# Patient Record
Sex: FEMALE
[De-identification: names, ages, dates, MRNs, and addresses within clinical notes are randomized per-mention and may not be internally consistent; named-entity substitution may affect disease eponyms.]

---

## 2020-07-17 PROBLEM — Z00.00 ENCOUNTER FOR PREVENTIVE HEALTH EXAMINATION: Status: ACTIVE | Noted: 2020-07-17

## 2020-07-28 ENCOUNTER — APPOINTMENT (OUTPATIENT)
Dept: ORTHOPEDIC SURGERY | Facility: CLINIC | Age: 55
End: 2020-07-28
Payer: COMMERCIAL

## 2020-07-28 VITALS
SYSTOLIC BLOOD PRESSURE: 130 MMHG | HEIGHT: 64 IN | DIASTOLIC BLOOD PRESSURE: 87 MMHG | WEIGHT: 158 LBS | BODY MASS INDEX: 26.98 KG/M2 | HEART RATE: 86 BPM

## 2020-07-28 PROCEDURE — 73564 X-RAY EXAM KNEE 4 OR MORE: CPT | Mod: RT

## 2020-07-28 PROCEDURE — 99204 OFFICE O/P NEW MOD 45 MIN: CPT | Mod: 25

## 2020-07-28 PROCEDURE — 20611 DRAIN/INJ JOINT/BURSA W/US: CPT | Mod: RT

## 2020-07-28 RX ORDER — MULTIVITAMIN
TABLET ORAL
Refills: 0 | Status: ACTIVE | COMMUNITY

## 2020-07-28 RX ORDER — MULTIVIT-MIN/IRON/FOLIC ACID/K 18-600-40
CAPSULE ORAL
Refills: 0 | Status: ACTIVE | COMMUNITY

## 2020-07-28 RX ORDER — UBIDECARENONE 50 MG
600 CAPSULE ORAL
Refills: 0 | Status: ACTIVE | COMMUNITY

## 2020-07-28 RX ORDER — CHROMIUM 200 MCG
TABLET ORAL
Refills: 0 | Status: ACTIVE | COMMUNITY

## 2020-07-28 NOTE — PHYSICAL EXAM
[de-identified] : The patient appears well nourished  and in no apparent distress.  The patient is alert and oriented to person, place, and time.   Affect and mood appear normal. The head is normocephalic and atraumatic.  The eyes reveal normal sclera and extra ocular muscles are intact. The tongue is midline with no apparent lesions.  Skin shows normal turgor with no evidence of eczema or psoriasis.  No respiratory distress noted.  Sensation grossly intact.\par   [de-identified] : Exam of the right knee shows a small effusion, medial joint line tenderness to palpation, discomfort with Simon testing, flexion of 135 degrees. 5/5 motor strength bilaterally distally. Sensation intact distally.  [de-identified] : Xray- 4 views of the right knee shows mild arthritis of the medial compartment of the right knee.

## 2020-07-28 NOTE — CONSULT LETTER
[Dear  ___] : Dear  [unfilled], [Please see my note below.] : Please see my note below. [Consult Letter:] : I had the pleasure of evaluating your patient, [unfilled]. [Sincerely,] : Sincerely, [Consult Closing:] : Thank you very much for allowing me to participate in the care of this patient.  If you have any questions, please do not hesitate to contact me. [FreeTextEntry2] : COLTON MARIE MD\par  [FreeTextEntry3] : Lee Webster MD\par Chief of Joint Replacement\par [facility] \par [facility] [place]\par \par

## 2020-07-28 NOTE — HISTORY OF PRESENT ILLNESS
[de-identified] : The patient is a 55 year old female being seen for evaluation of her right knee. She denies injury, trauma, or change of activity. She reports pain began in February and was intermittent in nature. She reports 1 month ago pain progressed. She is ambulating and transferring with intermittent pain. She reports pain in the medial aspect of the right knee. She reports a snapping sensation. Pain is worse with transferring and weightbearing activities, most especially stair climbing. She reports using ice with mild relief of her symptoms. She reports using Naproxen with mild relief of her symptoms. She comes in today for evaluation of the right knee and for treatment options.

## 2020-07-28 NOTE — DISCUSSION/SUMMARY
[de-identified] : The patient is a 55 year old female with a possible medial meniscus tear in the setting of mild arthritis of the medial compartment of the right knee. Conservative options were discussed. She was given a cortisone injection in the right knee under ultrasound-guidance. She was sent for an MRI to evaluate the medial meniscus of the right knee. She was given a prescription for anti-inflammatories. I recommended a course of Mobic. The patient was given a prescription for the Mobic with directions. She was instructed to stop the medicine and call the office if there are any adverse reaction to the medicine. She was also instructed to consult with their primary care doctor prior to starting the medication. She will follow up with the results of the MRI for further treatment options.

## 2020-07-28 NOTE — PROCEDURE
[de-identified] : Using sterile technique, 2cc of depomedrol 40mg/ml, 4cc of 1% plain lidocaine, and 2 cc 0.25% marcaine was drawn up into a sterile syringe. The right knee joint space was identified using ultrasound. The right knee was then sterilely prepped with chlorhexidine. Ethyl chloride spray was used to anesthetize the skin and subQ tissue. The depomedrol/lidocaine/marcaine mixture was then injected into the knee joint in the superolateral position under ultrasound guidance and the needle position in the joint space was confirmed. The patient tolerated the procedure well without difficulty. The patient was given instructions on the use of ice and anti-inflammatories post injection site soreness.

## 2020-07-28 NOTE — ADDENDUM
[FreeTextEntry1] : This note was authored by Swapnil Forte working as a medical scribe for Dr. Lee Webster. The note was reviewed, edited, and revised by Dr. Lee Webster whom is in agreement with the exam findings, imaging findings, and treatment plan. 07/28/2020.

## 2020-08-19 ENCOUNTER — RX RENEWAL (OUTPATIENT)
Age: 55
End: 2020-08-19

## 2020-09-01 ENCOUNTER — APPOINTMENT (OUTPATIENT)
Dept: ORTHOPEDIC SURGERY | Facility: CLINIC | Age: 55
End: 2020-09-01
Payer: COMMERCIAL

## 2020-09-01 VITALS — HEIGHT: 64 IN | BODY MASS INDEX: 26.98 KG/M2 | WEIGHT: 158 LBS

## 2020-09-01 PROCEDURE — 99213 OFFICE O/P EST LOW 20 MIN: CPT

## 2020-09-01 NOTE — PHYSICAL EXAM
[de-identified] : The patient appears well nourished  and in no apparent distress.  The patient is alert and oriented to person, place, and time.   Affect and mood appear normal. The head is normocephalic and atraumatic.  The eyes reveal normal sclera and extra ocular muscles are intact. The tongue is midline with no apparent lesions.  Skin shows normal turgor with no evidence of eczema or psoriasis.  No respiratory distress noted.  Sensation grossly intact.\par   [de-identified] : Exam of the right knee shows a small effusion, medial joint line tenderness to palpation, discomfort with Simon testing, flexion of 135 degrees. 5/5 motor strength bilaterally distally. Sensation intact distally.  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

## 2020-09-01 NOTE — HISTORY OF PRESENT ILLNESS
[de-identified] : Patient is a 55-year-old female presenting for MRI follow-up right knee.  She states she has had right knee pain since February but denied any injuries or trauma.  Patient was last seen in office on July 28, 2020 with medial joint line pain and discomfort with Sonya's testing.  Patient was sent for an MRI to evaluate for right knee medial meniscus tear.  She began some conservative management with meloxicam 7.5 mg twice daily which she continues to take and states is working very well for her.  She has not had physical therapy but is active with home exercise.  She has been icing and elevating the knee as needed. She received a Depo-Medrol injection to the right knee last visit which has helped greatly to reduce her pain. Overall her pain has much improved since last visit.  Patient presents today for MRI follow-up.

## 2020-09-01 NOTE — DISCUSSION/SUMMARY
[de-identified] : The patient is a 55 year old female with a degenerative medial meniscus tear in the setting of mild arthritis of the medial compartment of the right knee. Conservative options were discussed. She has great improvement of her symptoms from her previous cortisone injection. She was given a prescription for physical therapy. She was previously prescribed meloxicam and was recommended to take this during flares of pain. She may follow up as needed.

## 2021-01-07 ENCOUNTER — RX RENEWAL (OUTPATIENT)
Age: 56
End: 2021-01-07

## 2021-01-19 ENCOUNTER — APPOINTMENT (OUTPATIENT)
Dept: ORTHOPEDIC SURGERY | Facility: CLINIC | Age: 56
End: 2021-01-19
Payer: COMMERCIAL

## 2021-01-19 VITALS
BODY MASS INDEX: 26.98 KG/M2 | WEIGHT: 158 LBS | HEART RATE: 82 BPM | HEIGHT: 64 IN | DIASTOLIC BLOOD PRESSURE: 77 MMHG | SYSTOLIC BLOOD PRESSURE: 125 MMHG

## 2021-01-19 DIAGNOSIS — M25.561 PAIN IN RIGHT KNEE: ICD-10-CM

## 2021-01-19 PROCEDURE — 20610 DRAIN/INJ JOINT/BURSA W/O US: CPT | Mod: RT

## 2021-01-19 PROCEDURE — 99072 ADDL SUPL MATRL&STAF TM PHE: CPT

## 2021-01-19 PROCEDURE — 99213 OFFICE O/P EST LOW 20 MIN: CPT | Mod: 25

## 2021-01-19 PROCEDURE — 76942 ECHO GUIDE FOR BIOPSY: CPT | Mod: RT,59

## 2021-01-19 NOTE — DISCUSSION/SUMMARY
[de-identified] : The patient is a 55 year old female with mild to moderate arthritis of the medial compartment of the right knee. Conservative options were discussed. She was given a cortisone injection in the right knee today under ultrasound guidance.Visco Supplementation was ordered. We discussed the use of over-the-counter anti-inflammatories as well as activity modification and ice as needed. She may follow up for Visco Supplementation.

## 2021-01-19 NOTE — ADDENDUM
[FreeTextEntry1] : This note was authored by Swapnil Forte working as a medical scribe for Dr. Lee Webster. The note was reviewed, edited, and revised by Dr. Lee Webster whom is in agreement with the exam findings, imaging findings, and treatment plan. 01/19/2021.

## 2021-01-19 NOTE — HISTORY OF PRESENT ILLNESS
[de-identified] : Patient is a 55-year-old female pending for follow-up evaluation right knee pain. She has been seen in the past and diagnosed with a right knee medial meniscus tear as well as right knee osteoarthritis.  She has been treated conservatively thus far with therapy, and meloxicam both of which only worked to mildly reduce her symptoms.  Prior to this she had a steroid injection to be right knee in July 2020, which worked well for a few months but has now worn off.  Pain has returned and is localized mostly medially and anteriorly, worse with weightbearing activity as well as deep flexion of the knee.  Patient denies any falls or trauma.  She presents today to discuss further treatment options.

## 2021-01-19 NOTE — PROCEDURE
[de-identified] : Using sterile technique, 2cc of depomedrol 40mg/ml, 4cc of 1% plain lidocaine, and 2 cc 0.25% marcaine was drawn up into a sterile syringe. The right knee joint space was identified using ultrasound. The right knee was then sterilely prepped with chlorhexidine. Ethyl chloride spray was used to anesthetize the skin and subQ tissue. The depomedrol/lidocaine/marcaine mixture was then injected into the knee joint in the superolateral position under ultrasound guidance and the needle position in the joint space was confirmed. The patient tolerated the procedure well without difficulty. The patient was given instructions on the use of ice and anti-inflammatories post injection site soreness.

## 2021-01-19 NOTE — PHYSICAL EXAM
[de-identified] : The patient appears well nourished  and in no apparent distress.  The patient is alert and oriented to person, place, and time.   Affect and mood appear normal. The head is normocephalic and atraumatic.  The eyes reveal normal sclera and extra ocular muscles are intact. The tongue is midline with no apparent lesions.  Skin shows normal turgor with no evidence of eczema or psoriasis.  No respiratory distress noted.  Sensation grossly intact.\par   [de-identified] : Exam of the right knee shows a small to moderate effusion, -5 to 125 degrees of flexion with anteromedial tightness. 5/5 motor strength bilaterally distally. Sensation intact distally.  [de-identified] : Xray- 4 views of the right knee shows mild to moderate arthritis of the medial compartment of the right knee.

## 2021-03-30 ENCOUNTER — APPOINTMENT (OUTPATIENT)
Dept: ORTHOPEDIC SURGERY | Facility: CLINIC | Age: 56
End: 2021-03-30
Payer: COMMERCIAL

## 2021-03-30 VITALS
OXYGEN SATURATION: 100 % | HEART RATE: 89 BPM | BODY MASS INDEX: 26.98 KG/M2 | WEIGHT: 158 LBS | DIASTOLIC BLOOD PRESSURE: 74 MMHG | SYSTOLIC BLOOD PRESSURE: 117 MMHG | HEIGHT: 64 IN

## 2021-03-30 PROCEDURE — 76942 ECHO GUIDE FOR BIOPSY: CPT | Mod: RT,59

## 2021-03-30 PROCEDURE — 99213 OFFICE O/P EST LOW 20 MIN: CPT | Mod: 25

## 2021-03-30 PROCEDURE — 20610 DRAIN/INJ JOINT/BURSA W/O US: CPT | Mod: RT

## 2021-03-30 PROCEDURE — 99072 ADDL SUPL MATRL&STAF TM PHE: CPT

## 2021-03-30 NOTE — HISTORY OF PRESENT ILLNESS
[de-identified] : Patient is a 55-year-old female presenting for follow-up right knee pain. She has been seen in the past and diagnosed with a right knee medial meniscus tear as well as right knee osteoarthritis.  She has been treated conservatively thus far with therapy, and meloxicam both of which only worked to mildly reduce her symptoms.  Prior to this she had a steroid injection to be right knee in January of 2021, which worked well for a few months but has now worn off.  Pain has returned and is localized mostly medially and anteriorly, worse with weightbearing activity as well as deep flexion of the knee. She was previously indicated for right knee Euflexxa injections and presents today for the first of the series.

## 2021-03-30 NOTE — DISCUSSION/SUMMARY
[de-identified] : The patient is a 55 year old female with mild to moderate arthritis of the medial compartment of the right knee. Conservative options were discussed.  Patient received the first of 3 Euflexxa injections to the right knee using sterile technique and tolerated well.  She has been advised to ice elevate the knee at home.  All questions were answered.  She will follow up in 1 week for the second injection.

## 2021-03-30 NOTE — PHYSICAL EXAM
[de-identified] : The patient appears well nourished  and in no apparent distress.  The patient is alert and oriented to person, place, and time.   Affect and mood appear normal. The head is normocephalic and atraumatic.  The eyes reveal normal sclera and extra ocular muscles are intact. The tongue is midline with no apparent lesions.  Skin shows normal turgor with no evidence of eczema or psoriasis.  No respiratory distress noted.  Sensation grossly intact.\par   [de-identified] : Exam of the right knee shows a small to moderate effusion, -5 to 125 degrees of flexion with anteromedial tightness. 5/5 motor strength bilaterally distally. Sensation intact distally.  [de-identified] : prior Xray- 4 views of the right knee shows mild to moderate arthritis of the medial compartment of the right knee.

## 2021-03-30 NOTE — REASON FOR VISIT
[Follow-Up Visit] : a follow-up visit for [Other: ____] : [unfilled] [FreeTextEntry2] : ; 1/3 Euflexxa LOT [id number] EXP 10/13/21 per week x 3 weeks for the right  knee

## 2021-03-30 NOTE — PROCEDURE
[de-identified] : Allergies: The patient denies allergies to medications and has no allergies to chicken,eggs, or feathers.\par Procedure: The patient has been identified by name and date of birth. Patient confirms that we are treating the bilateral knee today.\par The knee was prepped in the usual sterile fashion. The knee joint space was identified using ultrasound. The areas were cleansed with chlorhexadine, then sprayed with ethyl chloride. The patient was then injected with the Euflexxa into the right knee using ultrasound guidance and the needle position in the joint space was confirmed. The patient tolerated the procedure well. The medication was delivered aseptically and atraumatically.\par Diagnosis: Osteoarthritis of the bilateral knee\par Treatment: The patient was advised on the activities for today. I gave the patient instructions on postinjection ice and analgesia.\par

## 2021-04-06 ENCOUNTER — APPOINTMENT (OUTPATIENT)
Dept: ORTHOPEDIC SURGERY | Facility: CLINIC | Age: 56
End: 2021-04-06
Payer: COMMERCIAL

## 2021-04-06 VITALS
BODY MASS INDEX: 26.98 KG/M2 | SYSTOLIC BLOOD PRESSURE: 110 MMHG | WEIGHT: 158 LBS | HEART RATE: 71 BPM | HEIGHT: 64 IN | DIASTOLIC BLOOD PRESSURE: 71 MMHG | OXYGEN SATURATION: 99 %

## 2021-04-06 DIAGNOSIS — S83.8X1A SPRAIN OF OTHER SPECIFIED PARTS OF RIGHT KNEE, INITIAL ENCOUNTER: ICD-10-CM

## 2021-04-06 PROCEDURE — 99072 ADDL SUPL MATRL&STAF TM PHE: CPT

## 2021-04-06 PROCEDURE — 20610 DRAIN/INJ JOINT/BURSA W/O US: CPT | Mod: RT

## 2021-04-06 PROCEDURE — 76942 ECHO GUIDE FOR BIOPSY: CPT | Mod: RT,59

## 2021-04-06 RX ORDER — MOMETASONE 50 UG/1
50 SPRAY, METERED NASAL
Qty: 17 | Refills: 0 | Status: ACTIVE | COMMUNITY
Start: 2020-07-31

## 2021-04-06 NOTE — HISTORY OF PRESENT ILLNESS
[de-identified] : The patient is here today for a Euflexxa injection for the right knee. The patient is having osteoarthritic symptoms. The patient was seen previously and was indicated for Euflexxa injections.\par Allergies: The patient denies allergies to medications and has no allergies to chicken,eggs, or feathers.\par Procedure: The patient has been identified by name and date of birth. Patient confirms that we are treating the right knee today.\par The knee was prepped in the usual sterile fashion. The knee joint space was identified using ultrasound. The areas were cleansed with chlorhexadine, then sprayed with ethyl chloride. The patient was then injected with the Euflexxa into the right knee using ultrasound guidance  and the needle position in the superolateral joint space was confirmed. The patient tolerated the procedure well. The medication was delivered aseptically and atraumatically.\par Diagnosis: Osteoarthritis of the right knee\par Treatment: The patient was advised on the activities for today. I gave the patient instructions on postinjection ice and analgesia.\par The patient will follow up in one week for their next injection to be administered.

## 2021-04-06 NOTE — REASON FOR VISIT
[Follow-Up Visit] : a follow-up visit for [Other: ____] : [unfilled] [FreeTextEntry2] : right knee pain. ; 2/3 Euflexxa LOT [id number]  EXP 09/28/21 per week x 3 weeks for the right knee. \par  \par

## 2021-04-13 ENCOUNTER — APPOINTMENT (OUTPATIENT)
Dept: ORTHOPEDIC SURGERY | Facility: CLINIC | Age: 56
End: 2021-04-13
Payer: COMMERCIAL

## 2021-04-13 VITALS — WEIGHT: 158 LBS | HEIGHT: 64 IN | BODY MASS INDEX: 26.98 KG/M2

## 2021-04-13 DIAGNOSIS — Z82.49 FAMILY HISTORY OF ISCHEMIC HEART DISEASE AND OTHER DISEASES OF THE CIRCULATORY SYSTEM: ICD-10-CM

## 2021-04-13 DIAGNOSIS — Z82.5 FAMILY HISTORY OF ASTHMA AND OTHER CHRONIC LOWER RESPIRATORY DISEASES: ICD-10-CM

## 2021-04-13 PROCEDURE — 20610 DRAIN/INJ JOINT/BURSA W/O US: CPT

## 2021-04-13 PROCEDURE — 76942 ECHO GUIDE FOR BIOPSY: CPT | Mod: 59

## 2021-04-13 PROCEDURE — 99072 ADDL SUPL MATRL&STAF TM PHE: CPT

## 2021-04-13 RX ORDER — MELOXICAM 7.5 MG/1
7.5 TABLET ORAL TWICE DAILY
Qty: 60 | Refills: 0 | Status: DISCONTINUED | COMMUNITY
Start: 2020-07-28 | End: 2021-04-13

## 2021-04-13 RX ORDER — HYALURONATE SODIUM 20 MG/2 ML
20 SYRINGE (ML) INTRAARTICULAR
Qty: 1 | Refills: 0 | Status: DISCONTINUED | OUTPATIENT
Start: 2021-01-19 | End: 2021-04-13

## 2021-04-13 NOTE — REASON FOR VISIT
[Follow-Up Visit] : a follow-up visit for [Other: ____] : [unfilled] [FreeTextEntry2] : Right knee 3/3 Euflexxa LOT [id number] EXP 09/28/21

## 2021-04-13 NOTE — HISTORY OF PRESENT ILLNESS
[de-identified] : The patient is here today for their 3/3 Euflexxa injection for the right knee.\par Allergies: The patient denies allergies to medications and has no allergies to chicken,eggs, or feathers.\par Procedure: The patient has been identified by name and date of birth. Patient confirms that we are treating the right knee today.\par The knee was prepped in the usual sterile fashion. The knee joint space was identified using ultrasound. The areas were cleansed with chlorhexadine, then sprayed with ethyl chloride. The patient was then injected with the Euflexxa into the right knee using ultrasound guidance and the needle position in the superolateral joint space was confirmed. The patient tolerated the procedure well. The medication was delivered aseptically and atraumatically.\par Diagnosis: Osteoarthritis of the right knee\par Treatment: The patient was advised on the activities for today. I gave the patient instructions on postinjection ice and analgesia.\par The patient will follow up in 6 weeks for repeat evaluation.

## 2021-04-13 NOTE — ADDENDUM
[FreeTextEntry1] : This note was authored by Swapnil Forte working as a medical scribe for Dr. Lee Webster. The note was reviewed, edited, and revised by Dr. Lee Webster whom is in agreement with the exam findings, imaging findings, and treatment plan. 04/13/2021.

## 2021-07-06 ENCOUNTER — APPOINTMENT (OUTPATIENT)
Dept: ORTHOPEDIC SURGERY | Facility: CLINIC | Age: 56
End: 2021-07-06
Payer: COMMERCIAL

## 2021-07-06 VITALS
HEIGHT: 64 IN | WEIGHT: 158 LBS | BODY MASS INDEX: 26.98 KG/M2 | SYSTOLIC BLOOD PRESSURE: 103 MMHG | HEART RATE: 81 BPM | DIASTOLIC BLOOD PRESSURE: 66 MMHG

## 2021-07-06 DIAGNOSIS — M25.562 PAIN IN LEFT KNEE: ICD-10-CM

## 2021-07-06 DIAGNOSIS — M17.11 UNILATERAL PRIMARY OSTEOARTHRITIS, RIGHT KNEE: ICD-10-CM

## 2021-07-06 PROCEDURE — 73564 X-RAY EXAM KNEE 4 OR MORE: CPT | Mod: RT

## 2021-07-06 PROCEDURE — 99213 OFFICE O/P EST LOW 20 MIN: CPT

## 2021-07-06 RX ORDER — MELOXICAM 7.5 MG/1
7.5 TABLET ORAL
Qty: 60 | Refills: 0 | Status: DISCONTINUED | COMMUNITY
Start: 2020-12-11 | End: 2021-07-06

## 2021-07-06 NOTE — HISTORY OF PRESENT ILLNESS
[de-identified] : Ms. LAYLA RUBIO is a 55 year old female presenting for follow up right knee. She was completed Euflexxa injections to the right knee 4/13/2021.  Patient notes good relief from these injections and states her pain is much more tolerable.  She admits to intermittent stiffness of the knee, however is states she "has more good days than bad".  Patient states today it is a good day.  When she does have intermittent stiffness and pain the pain is localized medially.  She is currently not going to any physical therapy.  Patient does state she is active with home exercise.  She takes meloxicam as needed.  Overall she is doing much better.

## 2021-07-06 NOTE — DISCUSSION/SUMMARY
[de-identified] : Patient is a 55-year-old female with osteoarthritis of the right knee.  Conservative options were discussed.  She notes significant improvement with Euflexxa injections.  She denies need for any further injections today.  A prescription for physical therapy was provided.  I have offered to send meloxicam to pharmacy however patient states she has enough at home and will call as needed.  Follow-up recommended as needed.

## 2021-07-06 NOTE — PHYSICAL EXAM
[de-identified] : Multi body exam \par The patient appears well nourished and in no apparent distress. The patient is alert and oriented to person, place, and time. Affect and mood appear normal. The head is normocephalic and atraumatic. The eyes reveal normal sclera and extra ocular muscles are intact. The tongue is midline with no apparent lesions. Skin shows normal turgor with no evidence of eczema or psoriasis. No respiratory distress noted. Sensation grossly intact.\par   [de-identified] : Exam right knee: Skin is within normal limits, there is no effusion.  Range of motion 0 to 130 degrees of flexion measured with goniometer.  There is no medial joint line tenderness.  Negative Sonya's negative Lachman's. [de-identified] : X-ray 4 views right knee demonstrate osteoarthritis with medial joint space narrowing.